# Patient Record
(demographics unavailable — no encounter records)

---

## 2025-01-09 NOTE — ASSESSMENT
[FreeTextEntry1] : She has a history of mild tinnitus.  There is no cerumen impaction on exam.  Audiogram showed a bilateral high-frequency sensorineural hearing loss with asymmetry in the left ear.  She thinks it may have been present on the last hearing test.   PLAN  -findings and management options discussed in detail with the patient.  -good aural hygiene -avoid using cotton swabs in the ears -wax removal drops as needed.  -noise precautions -annual audiogram -she may consider HAE -I discussed the possibility of retrocochlear pathology causing the asymmetric hearing loss.  The options are observation with serial audiograms, ABR, and MRI to rule out retrocochlear pathology. The patient has opted for observation with repeat audiogram in 1 year.  If she changes her mind, she will let me know. -follow up in one year -call and return earlier if any concerns.

## 2025-01-09 NOTE — PHYSICAL EXAM

## 2025-01-09 NOTE — CONSULT LETTER
[Dear  ___] : Dear  [unfilled], [Consult Letter:] : I had the pleasure of evaluating your patient, [unfilled]. [Please see my note below.] : Please see my note below. [Consult Closing:] : Thank you very much for allowing me to participate in the care of this patient.  If you have any questions, please do not hesitate to contact me. [Sincerely,] : Sincerely, [FreeTextEntry3] : Layla Teresa MD [facility] at [facility] Otolaryngology  Head & Neck Surgery [facility] Eye, Ear [facility] Thro[facility]   Department of Otolaryngology [facility] at [facility]  Office Tel: [phone]

## 2025-01-09 NOTE — HISTORY OF PRESENT ILLNESS
[de-identified] : HENRY BALDWIN is a 78 year old patient referred by Dr. Santiago For evaluation for hearing loss.  She has mild tinnitus but no otalgia or otorrhea.  She has not had a recent audiogram.  No history of recurrent ear infections, prior otologic surgery, excessive noise exposure or family history of hearing loss

## 2025-01-09 NOTE — CONSULT LETTER
[Dear  ___] : Dear  [unfilled], [Consult Letter:] : I had the pleasure of evaluating your patient, [unfilled]. [Please see my note below.] : Please see my note below. [Consult Closing:] : Thank you very much for allowing me to participate in the care of this patient.  If you have any questions, please do not hesitate to contact me. [Sincerely,] : Sincerely, [FreeTextEntry3] : Layla Teresa MD [facility] at [facility] Otolaryngology  Head & Neck Surgery [facility] Eye, Ear & Throat [facility]   Department of Otolaryngology [facility] at [facility]  Office Tel: [phone]

## 2025-01-09 NOTE — HISTORY OF PRESENT ILLNESS
[de-identified] : HENRY BALDWIN is a 78 year old patient referred by Dr. Santiago For evaluation for hearing loss.  She has mild tinnitus but no otalgia or otorrhea.  She has not had a recent audiogram.  No history of recurrent ear infections, prior otologic surgery, excessive noise exposure or family history of hearing loss